# Patient Record
(demographics unavailable — no encounter records)

---
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
## 2025-01-24 NOTE — IMAGING
[de-identified] : Spine: Inspection/Palpation: No tenderness to palpation throughout Cervical/thoracic/lumbar spine. TTP over right trap and right cerivcal paraspinals No bony stepoffs, No lesions.   Gait: Non-antalgic,    Range of Motion: Cervical Spine: Flexion to chin to chest, extension to 70 degrees,      Neurologic: Bilateral upper extremities 5/5 Deltoid/Biceps/Triceps/ Wrist Flexion/Wrist Extension/ / Intrinsics Except   Sensation intact to light touch C5-T1      Negative Hardwick's,  X-ray Ap/Lateral/Flexion/Extension of cervical spine were viewed and interpreted. loss of lordosis. multilevel degen changes. slight c3-4 spondylolisthesis

## 2025-03-18 NOTE — DATA REVIEWED
[MRI] : MRI [Cervical Spine] : cervical spine [I independently reviewed and interpreted images and report] : I independently reviewed and interpreted images and report [FreeTextEntry1] : 1. Straightening of the cervical lordosis. 2. Modic type 1 endplate changes at C4-C5. 3. At C3-4: Disc bulge without canal stenosis or neural foraminal narrowing. 4. At C4-5: Disc bulge with uncinate hypertrophy resulting in mild spinal canal stenosis and severe right and moderate left-sided neural foraminal narrowing. 5. At C5-6: Disc bulge with uncinate hypertrophy resulting in severe left and moderate right-sided neural foraminal narrowing. 6. At C6-7: Disc bulge with uncinate hypertrophy resulting in mild spinal canal stenosis and severe right and moderate left-sided neural foraminal narrowing.  Stable examination.

## 2025-03-18 NOTE — IMAGING
[de-identified] : Spine: Inspection/Palpation: No tenderness to palpation throughout Cervical/thoracic/lumbar spine. TTP over right trap and right cerivcal paraspinals No bony stepoffs, No lesions.   Gait: Non-antalgic,    Range of Motion: Cervical Spine: Flexion to chin to chest, extension to 70 degrees,      Neurologic: Bilateral upper extremities 5/5 Deltoid/Biceps/Triceps/ Wrist Flexion/Wrist Extension/ / Intrinsics Except   Sensation intact to light touch C5-T1      Negative Hardwick's,  X-ray Ap/Lateral/Flexion/Extension of cervical spine were viewed and interpreted. loss of lordosis. multilevel degen changes. slight c3-4 spondylolisthesis
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
## 2025-04-11 NOTE — ASSESSMENT
[FreeTextEntry1] : We discussed the nature of the underlying pathology and available pain management treatment options. These included interventional, rehabilitative, pharmacological, and complementary modalities. We will proceed with the following:    Interventional treatment options: - Proceed with _ with fluoroscopic guidance - None indicated at present time  - see additional instructions below    Rehabilitative options: - Initiate trial of physical therapy - Continue physical therapy - Participation in active HEP was discussed and encouraged as tolerated - Home exercise sheet provided   Medication based treatment options: - Initiate trial of _ - Continue _ - See additional instructions below    Complementary treatment options: - Weight management and lifestyle modifications discussed   Additional treatment recommendations as follows: - patient will follow up _  ILily, acting as scribe, attest that this documentation has been prepared under the direction and in the presence of Provider Filiberto Yin DO.  The documentation recorded by the scribe, in my presence, accurately reflects the service I personally performed, and the decisions made by me with my edits as appropriate.

## 2025-06-06 NOTE — DATA REVIEWED
[FreeTextEntry1] :  MRI images personally reviewed and reviewed with patient. Patient notified to follow up with PCP about any incidental findings. C/S MRI [facility] 01/2025: 1. Straightening of the cervical lordosis. 2. Modic type 1 endplate changes at C4-C5. 3. At C3-4: Disc bulge without canal stenosis or neural foraminal narrowing. 4. At C4-5: Disc bulge with uncinate hypertrophy resulting in mild spinal canal stenosis and severe right and moderate left-sided neural foraminal narrowing. 5. At C5-6: Disc bulge with uncinate hypertrophy resulting in severe left and moderate right-sided neural foraminal narrowing. 6. At C6-7: Disc bulge with uncinate hypertrophy resulting in mild spinal canal stenosis and severe right and moderate left-sided neural foraminal narrowing.  Stable examination.

## 2025-06-06 NOTE — PHYSICAL EXAM
[de-identified] : General: Appears well nourished and well developed, no acute distress Musculoskeletal: Gait is non-antalgic, patient is ambulating without assistance Cervical Spine Exam:                      Inspection:    erythema (-)   ecchymosis (-)    rashes (-)                         Palpation: Cervical paraspinal tenderness: R (-); L(-)  Upper trapezius tenderness: R (-); L (-)  Rhomboids tenderness: R (-); L (-)                      ROM:     Full range of motion with mild stiffness                          Strength Testin/5 in the bilateral upper extremities                      Reflexes: 2/2 in the bilateral upper extremities                      Sensation: Sensation to light touch grossly intact in the bilateral upper extremities                      Special Tests:  Spurling Test: R (+); L (-)  Facet load test: R (-); L (-), Hoffmans : R (-), L (-)

## 2025-06-06 NOTE — HISTORY OF PRESENT ILLNESS
[Neck] : neck [8] : 8 [5] : 5 [Tightness] : tightness [Tingling] : tingling [Constant] : constant [Leisure] : leisure [Sleep] : sleep [Social interactions] : social interactions [Meds] : meds [Heat] : heat [Massage] : massage [Physical therapy] : physical therapy [] : no [FreeTextEntry1] : right shoulder  [FreeTextEntry6] : stiffness, numbness, headaches  [FreeTextEntry7] : right arm to the hand  [de-identified] : turning the head, computer work, working

## 2025-06-06 NOTE — ASSESSMENT
[FreeTextEntry1] : 52F presents with neck and arm pain.  Cervical radiculitis, attempt LEX.  A discussion regarding available pain management treatment options occurred with the patient.  These included interventional, rehabilitative, pharmacological, and alternative modalities. We will proceed with the following:   Interventional treatment options: - Proceed with C7-1 LEX with fluoroscopic guidance - If inadequate relief would likely consider repeat with Depo - see additional instructions below      Rehabilitative options: - continue physical therapy - participation in active HEP was discussed and instructions provided   Medication based treatment options: - None   Complementary treatment options: - Weight management and lifestyle modifications discussed    Additional treatment recommendations as follows: - patient to follow up with Dr. Delgadillo - RTC after LEX  Patient is presenting with acute/sub-acute pain with impairment in ADLs and functionality. The pain has not responded to at least 6 weeks of conservative care including NSAID therapy, OTC analgesics and/or physical therapy and/or home exercise program within the last 3 months.  The risks, benefits and alternatives of the proposed procedure were explained in detail with the patient. The risks outlined include but are not limited to infection, bleeding, post- dural puncture headache, nerve injury, a temporary increase in pain, failure to resolve symptoms, need for future interventions, allergic reaction, and possible elevation of blood sugar in diabetics if using corticosteroid.  All questions were answered to patient's apparent satisfaction, and he/she verbalized an understanding.

## 2025-06-06 NOTE — PHYSICAL EXAM
[de-identified] : General: Appears well nourished and well developed, no acute distress Musculoskeletal: Gait is non-antalgic, patient is ambulating without assistance Cervical Spine Exam:                      Inspection:    erythema (-)   ecchymosis (-)    rashes (-)                         Palpation: Cervical paraspinal tenderness: R (-); L(-)  Upper trapezius tenderness: R (-); L (-)  Rhomboids tenderness: R (-); L (-)                      ROM:     Full range of motion with mild stiffness                          Strength Testin/5 in the bilateral upper extremities                      Reflexes: 2/2 in the bilateral upper extremities                      Sensation: Sensation to light touch grossly intact in the bilateral upper extremities                      Special Tests:  Spurling Test: R (+); L (-)  Facet load test: R (-); L (-), Hoffmans : R (-), L (-)

## 2025-06-06 NOTE — ASSESSMENT
----- Message from Krystyna Vázquez sent at 7/20/2018  1:22 PM CDT -----  Contact: ANA LUISA PHAM [[id number]]            Name of Who is Calling:ANA LUISA PHAM [[id number]]    What is the request in detail: patient states faxed was sent with results from [facility] on 06/26/18, 07/19/18,07/20/18. Patient states she would like nurse to check both fax numbers for doctor before calling her back. Please call     Can the clinic reply by [facility]: no    What Number to Call Back if not in [facility]: [phone]                                 [FreeTextEntry1] : 52F presents with neck and arm pain.  Cervical radiculitis, attempt LEX.  A discussion regarding available pain management treatment options occurred with the patient.  These included interventional, rehabilitative, pharmacological, and alternative modalities. We will proceed with the following:   Interventional treatment options: - Proceed with C7-1 LEX with fluoroscopic guidance - If inadequate relief would likely consider repeat with Depo - see additional instructions below      Rehabilitative options: - continue physical therapy - participation in active HEP was discussed and instructions provided   Medication based treatment options: - None   Complementary treatment options: - Weight management and lifestyle modifications discussed    Additional treatment recommendations as follows: - patient to follow up with Dr. Delgadillo - RTC after LEX  Patient is presenting with acute/sub-acute pain with impairment in ADLs and functionality. The pain has not responded to at least 6 weeks of conservative care including NSAID therapy, OTC analgesics and/or physical therapy and/or home exercise program within the last 3 months.  The risks, benefits and alternatives of the proposed procedure were explained in detail with the patient. The risks outlined include but are not limited to infection, bleeding, post- dural puncture headache, nerve injury, a temporary increase in pain, failure to resolve symptoms, need for future interventions, allergic reaction, and possible elevation of blood sugar in diabetics if using corticosteroid.  All questions were answered to patient's apparent satisfaction, and he/she verbalized an understanding.

## 2025-06-06 NOTE — HISTORY OF PRESENT ILLNESS
[Neck] : neck [8] : 8 [5] : 5 [Tightness] : tightness [Tingling] : tingling [Constant] : constant [Leisure] : leisure [Sleep] : sleep [Social interactions] : social interactions [Meds] : meds [Heat] : heat [Massage] : massage [Physical therapy] : physical therapy [] : no [FreeTextEntry1] : right shoulder  [FreeTextEntry6] : stiffness, numbness, headaches  [FreeTextEntry7] : right arm to the hand  [de-identified] : turning the head, computer work, working